# Patient Record
Sex: FEMALE | Race: WHITE | URBAN - METROPOLITAN AREA
[De-identification: names, ages, dates, MRNs, and addresses within clinical notes are randomized per-mention and may not be internally consistent; named-entity substitution may affect disease eponyms.]

---

## 2023-04-20 ENCOUNTER — PROCEDURE (OUTPATIENT)
Facility: LOCATION | Age: 63
End: 2023-04-20
Payer: COMMERCIAL

## 2023-04-20 DIAGNOSIS — H25.13 AGE-RELATED NUCLEAR CATARACT, BILATERAL: ICD-10-CM

## 2023-04-20 DIAGNOSIS — H04.123 DRY EYE SYNDROME OF BILATERAL LACRIMAL GLANDS: Primary | ICD-10-CM

## 2023-04-20 NOTE — IMPRESSION/PLAN
Impression: Examination revealed Cataracts OU. Patient reports blurry vision OU. Plan: 
RTC in 2 weeks for cat eval with Dr. Naresh Steiner.

## 2023-04-20 NOTE — IMPRESSION/PLAN
Impression: Examination revealed dry eye syndrome secondary to tear deficiencies. Ext x4 plugs placed today. Patient was consented and tolerated the procedure well. Plan: Continue ATs QID OU. RTC in 2-3 weeks for cat eval with Dr. Aleks Salazar.

## 2023-05-02 ENCOUNTER — OFFICE VISIT (OUTPATIENT)
Facility: LOCATION | Age: 63
End: 2023-05-02
Payer: COMMERCIAL

## 2023-05-02 DIAGNOSIS — H04.123 DRY EYE SYNDROME OF BILATERAL LACRIMAL GLANDS: ICD-10-CM

## 2023-05-02 DIAGNOSIS — H25.813 COMBINED FORMS OF AGE-RELATED CATARACT, BILATERAL: Primary | ICD-10-CM

## 2023-05-02 DIAGNOSIS — H16.223 KERATOCONJUNCT SICCA, NOT SPECIFIED AS SJOGREN'S, BILATERAL: ICD-10-CM

## 2023-05-02 DIAGNOSIS — H21.543 POSTERIOR SYNECHIAE (IRIS), BILATERAL: ICD-10-CM

## 2023-05-02 PROCEDURE — 92025 CPTRIZED CORNEAL TOPOGRAPHY: CPT | Performed by: OPHTHALMOLOGY

## 2023-05-02 PROCEDURE — 92134 CPTRZ OPH DX IMG PST SGM RTA: CPT | Performed by: OPHTHALMOLOGY

## 2023-05-02 PROCEDURE — 92136 OPHTHALMIC BIOMETRY: CPT | Performed by: OPHTHALMOLOGY

## 2023-05-02 PROCEDURE — 99214 OFFICE O/P EST MOD 30 MIN: CPT | Performed by: OPHTHALMOLOGY

## 2023-05-02 RX ORDER — DIFLUPREDNATE OPHTHALMIC 0.5 MG/ML
0.05 % EMULSION OPHTHALMIC
Qty: 5 | Refills: 0 | Status: ACTIVE
Start: 2023-05-02

## 2023-05-02 RX ORDER — OFLOXACIN 3 MG/ML
0.3 % SOLUTION/ DROPS OPHTHALMIC
Qty: 10 | Refills: 2 | Status: ACTIVE
Start: 2023-05-02

## 2023-05-02 RX ORDER — KETOROLAC TROMETHAMINE 5 MG/ML
0.5 % SOLUTION OPHTHALMIC
Qty: 10 | Refills: 2 | Status: ACTIVE
Start: 2023-05-02

## 2023-05-02 ASSESSMENT — VISUAL ACUITY
OS: 20/80
OD: 20/60

## 2023-05-02 ASSESSMENT — INTRAOCULAR PRESSURE
OS: 12
OD: 10

## 2023-05-02 NOTE — IMPRESSION/PLAN
Impression: Keratoconjunct sicca, not specified as Sjogren's, bilateral: J48.261. Plan: Refer to dry eye plan.

## 2023-05-02 NOTE — IMPRESSION/PLAN
Impression: Dry eye syndrome of bilateral lacrimal glands: H04.123. Plan: Patient to continue PFATs QID OU.

## 2023-05-02 NOTE — IMPRESSION/PLAN
Impression: Combined forms of age-related cataract, bilateral: H25.813. Plan: Discussed R/B/A of cataract surgery including risk of bleeding, infection, decreased BCVA, loss of eye. No guarantees, possible need  for further surgery. Patient expressed good understanding and wishes to proceed with CE/IOL OD then OS. Explained to patient that cataract surgery is a procedure performed on the eye and any procedure of the eye is known to cause or aggravate ocular surface disease and dry eye issues. Explained to patient that this can be a lifelong concern even without cataract surgery and the cost of this is not covered by the cost of cataract surgery. Patient expresses understanding. Patient is leaning towards standard cataract surgery. Discussed with patient that she may need glasses after cataract surgery. No guarantees were given. Patient expressed understanding. Goal: TBD

RTC for pre-op.

## 2023-05-02 NOTE — IMPRESSION/PLAN
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 Plan: Start Difluprednate BID OU -rx sent.

## 2023-05-09 ENCOUNTER — OFFICE VISIT (OUTPATIENT)
Facility: LOCATION | Age: 63
End: 2023-05-09
Payer: COMMERCIAL

## 2023-05-09 DIAGNOSIS — H21.543 POSTERIOR SYNECHIAE (IRIS), BILATERAL: ICD-10-CM

## 2023-05-09 DIAGNOSIS — H25.813 COMBINED FORMS OF AGE-RELATED CATARACT, BILATERAL: Primary | ICD-10-CM

## 2023-05-09 DIAGNOSIS — H40.033 ANATOMICAL NARROW ANGLE, BILATERAL: ICD-10-CM

## 2023-05-09 DIAGNOSIS — H04.123 DRY EYE SYNDROME OF BILATERAL LACRIMAL GLANDS: ICD-10-CM

## 2023-05-09 PROCEDURE — 99213 OFFICE O/P EST LOW 20 MIN: CPT | Performed by: OPHTHALMOLOGY

## 2023-05-09 ASSESSMENT — INTRAOCULAR PRESSURE
OS: 12
OD: 12

## 2023-05-09 NOTE — IMPRESSION/PLAN
Impression: Combined forms of age-related cataract, bilateral: H25.813. Plan: Discussed R/B/A of cataract surgery including risk of bleeding, infection, decreased BCVA, loss of eye. No guarantees, possible need  for further surgery. Patient expressed good understanding and wishes to proceed with CE/IOL OD then OS. Patient is leaning towards standard cataract surgery. Discussed with patient that she may need glasses after cataract surgery. No guarantees were given. Patient expressed understanding. Goal: plano OU [place] cuff day of cataract surgery.   Dextenza day of surgery

## 2023-05-25 ENCOUNTER — POST-OPERATIVE VISIT (OUTPATIENT)
Facility: LOCATION | Age: 63
End: 2023-05-25
Payer: COMMERCIAL

## 2023-05-25 DIAGNOSIS — Z48.810 ENCOUNTER FOR SURGICAL AFTERCARE FOLLOWING SURGERY ON A SENSE ORGAN: Primary | ICD-10-CM

## 2023-05-25 DIAGNOSIS — H25.812 COMBINED FORMS OF AGE-RELATED CATARACT, LEFT EYE: ICD-10-CM

## 2023-05-25 PROCEDURE — 99024 POSTOP FOLLOW-UP VISIT: CPT | Performed by: OPHTHALMOLOGY

## 2023-05-25 RX ORDER — PREDNISOLONE ACETATE 10 MG/ML
1 % SUSPENSION/ DROPS OPHTHALMIC
Qty: 3 | Refills: 2 | Status: ACTIVE
Start: 2023-05-25

## 2023-05-25 ASSESSMENT — INTRAOCULAR PRESSURE
OD: 15
OS: 16

## 2023-05-25 ASSESSMENT — VISUAL ACUITY: OD: 20/70

## 2023-05-25 NOTE — IMPRESSION/PLAN
Impression: S/P Cataract Extraction by phacoemulsification with IOL placement OD - 6 Days. Encounter for surgical aftercare following surgery on a sense organ  Z48.810. Recommending patient hold off on cataract sx until OD is feeling better and healed. Plan: Patient to start Prednisilone Acetate BID OD. (rx sent) RTC in 1 week with Dr. Curt Bonilla.

## 2023-06-06 ENCOUNTER — POST-OPERATIVE VISIT (OUTPATIENT)
Facility: LOCATION | Age: 63
End: 2023-06-06
Payer: COMMERCIAL

## 2023-06-06 DIAGNOSIS — Z96.1 PRESENCE OF INTRAOCULAR LENS: Primary | ICD-10-CM

## 2023-06-06 PROCEDURE — 99024 POSTOP FOLLOW-UP VISIT: CPT | Performed by: OPHTHALMOLOGY

## 2023-06-06 PROCEDURE — 92015 DETERMINE REFRACTIVE STATE: CPT | Performed by: OPHTHALMOLOGY

## 2023-06-06 PROCEDURE — 92134 CPTRZ OPH DX IMG PST SGM RTA: CPT | Performed by: OPHTHALMOLOGY

## 2023-06-06 ASSESSMENT — VISUAL ACUITY: OD: 20/200

## 2023-06-06 ASSESSMENT — INTRAOCULAR PRESSURE: OD: 23

## 2023-06-06 NOTE — IMPRESSION/PLAN
Impression: S/P Cataract Extraction by phacoemulsification with IOL placement OD - 18 Days. Presence of intraocular lens  Z96.1. Patient complains of pain and blurred vision. Plan: Stop Ketorolac, Prednisolone, and Ofloxacin. Continue Difluprednate BID OU. Patient to switch to PFATS Q2H OU. RTC in 2 weeks for AR/MR OU and MAC OCT OU with Dr. Marilyn Castillo.

## 2023-06-20 ENCOUNTER — POST-OPERATIVE VISIT (OUTPATIENT)
Facility: LOCATION | Age: 63
End: 2023-06-20
Payer: COMMERCIAL

## 2023-06-20 DIAGNOSIS — Z96.1 PRESENCE OF INTRAOCULAR LENS: Primary | ICD-10-CM

## 2023-06-20 DIAGNOSIS — H26.491 OTHER SECONDARY CATARACT, RIGHT EYE: ICD-10-CM

## 2023-06-20 DIAGNOSIS — H25.812 COMBINED FORMS OF AGE-RELATED CATARACT, LEFT EYE: ICD-10-CM

## 2023-06-20 PROCEDURE — 92015 DETERMINE REFRACTIVE STATE: CPT | Performed by: OPHTHALMOLOGY

## 2023-06-20 PROCEDURE — 99024 POSTOP FOLLOW-UP VISIT: CPT | Performed by: OPHTHALMOLOGY

## 2023-06-20 PROCEDURE — 92134 CPTRZ OPH DX IMG PST SGM RTA: CPT | Performed by: OPHTHALMOLOGY

## 2023-06-20 RX ORDER — PREDNISONE 10 MG/1
10 MG TABLET ORAL
Qty: 30 | Refills: 0 | Status: ACTIVE
Start: 2023-06-20

## 2023-06-20 RX ORDER — DIFLUPREDNATE OPHTHALMIC 0.5 MG/ML
0.05 % EMULSION OPHTHALMIC
Qty: 5 | Refills: 0 | Status: ACTIVE
Start: 2023-06-20

## 2023-06-20 RX ORDER — PREDNISONE 10 MG/1
10 MG TABLET ORAL
Qty: 30 | Refills: 0 | Status: INACTIVE
Start: 2023-06-20 | End: 2023-06-20

## 2023-06-20 ASSESSMENT — VISUAL ACUITY
OD: 20/80
OS: 20/100

## 2023-06-20 ASSESSMENT — INTRAOCULAR PRESSURE
OD: 15
OS: 15

## 2023-06-20 NOTE — IMPRESSION/PLAN
Impression: Combined forms of age-related cataract, left eye: H25.812. Plan: Discussed R/B/A of cataract surgery including risk of bleeding, infection, decreased BCVA, loss of eye. No guarantees, possible need  for further surgery. Patient expressed good understanding and wishes to proceed with CE/IOL OS. Patient is leaning towards standard cataract surgery. Discussed with patient that she may need glasses after cataract surgery. No guarantees were given. Patient expressed understanding. Goal: [facility] CATARACT CASE: 25-30 minutes. Dextenza at the time of cataract surgery. Patient to start oral prednisone 30 mg PO daily -rx sent. Second eye consents signed today.

## 2023-06-20 NOTE — IMPRESSION/PLAN
Impression: S/P Cataract Extraction by phacoemulsification with IOL placement OD - 32 Days. Presence of intraocular lens  Z96.1. Plan: D/C all post op drops. RTC PRN decrease VA, increase pain, redness, discharge, photophobia, flashes/floaters, or other vision problems.

## 2023-06-20 NOTE — IMPRESSION/PLAN
Impression: Other secondary cataract, right eye: H26.491. Patient complains of blurred vision. Plan: Consider YAG PC OD in the future.

## 2023-06-30 ENCOUNTER — POST-OPERATIVE VISIT (OUTPATIENT)
Facility: LOCATION | Age: 63
End: 2023-06-30
Payer: COMMERCIAL

## 2023-06-30 DIAGNOSIS — Z96.1 PRESENCE OF INTRAOCULAR LENS: Primary | ICD-10-CM

## 2023-06-30 DIAGNOSIS — S05.02XA CORNEAL ABRASION W/O FB OF LEFT EYE, INITIAL ENCOUNTER: ICD-10-CM

## 2023-06-30 PROCEDURE — 99024 POSTOP FOLLOW-UP VISIT: CPT | Performed by: OPHTHALMOLOGY

## 2023-06-30 ASSESSMENT — INTRAOCULAR PRESSURE: OS: 24

## 2023-06-30 NOTE — IMPRESSION/PLAN
Impression: S/P Cataract Extraction by phacoemulsification with IOL placement OS - . Presence of intraocular lens  Z96.1. Examination shows 2+ edema. Plan: Patient to continue antibiotic, steroid, and NSAID as instructed. RTC 1 week as scheduled- check VA, Ks, MR, IOP and re-evaluation of operated eye. Patient reminded of post-operative restrictions (do not rub eye, do not bend over, do not  more than 10-15 lbs, do not sleep on side of surgery, do not get water, dust or dirt in eye, wear plastic shield while sleeping). RTC PRN decrease VA, increase pain, redness, discharge, photophobia, flashes/floaters, or other vision problems.

## 2023-06-30 NOTE — IMPRESSION/PLAN
Impression: Corneal abrasion w/o FB of left eye, initial encounter: S05.02xA. Examination shows corneal abrasion in OS. BCL placed today. Acuvue Marley 0.0 8.8 [place]. Plan: See above.

## 2023-07-06 ENCOUNTER — POST-OPERATIVE VISIT (OUTPATIENT)
Facility: LOCATION | Age: 63
End: 2023-07-06
Payer: COMMERCIAL

## 2023-07-06 DIAGNOSIS — Z96.1 PRESENCE OF INTRAOCULAR LENS: Primary | ICD-10-CM

## 2023-07-06 PROCEDURE — 99024 POSTOP FOLLOW-UP VISIT: CPT | Performed by: OPHTHALMOLOGY

## 2023-07-06 ASSESSMENT — INTRAOCULAR PRESSURE
OS: 11
OD: 10

## 2023-07-06 ASSESSMENT — VISUAL ACUITY
OS: 20/80
OD: 20/80

## 2023-07-06 NOTE — IMPRESSION/PLAN
Impression: S/P Cataract Extraction by phacoemulsification with IOL placement OS - 6 Days. Presence of intraocular lens  Z96.1. Patient reports pain in OD that is on going. Says as long as she keeps her OD closed there is no pain. OS has slight improvement. Plan: Patient to continue using PFATS QID OU. D/C the Ofloxaxacin. RTC with Dr. Ryanne Rendon.

## 2023-07-11 ENCOUNTER — POST-OPERATIVE VISIT (OUTPATIENT)
Facility: LOCATION | Age: 63
End: 2023-07-11
Payer: COMMERCIAL

## 2023-07-11 DIAGNOSIS — H57.11 OCULAR PAIN, RIGHT EYE: Primary | ICD-10-CM

## 2023-07-11 DIAGNOSIS — Z96.1 PRESENCE OF INTRAOCULAR LENS: ICD-10-CM

## 2023-07-11 PROCEDURE — 92134 CPTRZ OPH DX IMG PST SGM RTA: CPT | Performed by: OPHTHALMOLOGY

## 2023-07-11 PROCEDURE — 99024 POSTOP FOLLOW-UP VISIT: CPT | Performed by: OPHTHALMOLOGY

## 2023-07-11 ASSESSMENT — INTRAOCULAR PRESSURE
OD: 13
OS: 14

## 2023-07-11 ASSESSMENT — VISUAL ACUITY
OD: 20/80
OS: 20/80

## 2023-07-11 NOTE — IMPRESSION/PLAN
Impression: Ocular pain, right eye: H57.11. Patient c/o pain right eye that is progressing. ct orbits brain with and without contrast Plan: Ordered a CT scan of orbit and brain with/ without contrast.

Start tapering Prednisone 20 mg tab PO BID then 10 mg tab PO BID then stop. RTC 6 weeks for re-eval with Dr. Daniel Mayes to bring CD with her

## 2024-10-15 ENCOUNTER — OFFICE VISIT (OUTPATIENT)
Facility: LOCATION | Age: 64
End: 2024-10-15
Payer: MEDICARE

## 2024-10-15 DIAGNOSIS — H57.11 OCULAR PAIN, RIGHT EYE: Primary | ICD-10-CM

## 2024-10-15 PROCEDURE — 99214 OFFICE O/P EST MOD 30 MIN: CPT | Performed by: OPHTHALMOLOGY

## 2024-10-15 ASSESSMENT — VISUAL ACUITY
OD: 20/400
OS: CF 3FT

## 2024-10-15 ASSESSMENT — INTRAOCULAR PRESSURE
OS: 10
OD: 11

## 2024-10-29 ENCOUNTER — OFFICE VISIT (OUTPATIENT)
Facility: LOCATION | Age: 64
End: 2024-10-29
Payer: MEDICARE

## 2024-10-29 DIAGNOSIS — H57.11 OCULAR PAIN, RIGHT EYE: Primary | ICD-10-CM

## 2024-10-29 PROCEDURE — 99214 OFFICE O/P EST MOD 30 MIN: CPT | Performed by: OPHTHALMOLOGY

## 2024-10-29 RX ORDER — PREDNISOLONE ACETATE 10 MG/ML
1 % SUSPENSION/ DROPS OPHTHALMIC
Qty: 5 | Refills: 2 | Status: ACTIVE
Start: 2024-10-29

## 2024-10-29 RX ORDER — PREDNISONE 10 MG/1
10 MG TABLET ORAL
Qty: 30 | Refills: 0 | Status: ACTIVE
Start: 2024-10-29

## 2024-10-29 RX ORDER — ATROPINE SULFATE 10 MG/ML
1 % SOLUTION OPHTHALMIC
Qty: 5 | Refills: 2 | Status: ACTIVE
Start: 2024-10-29

## 2024-10-29 ASSESSMENT — INTRAOCULAR PRESSURE: OS: 14

## 2024-11-27 ENCOUNTER — OFFICE VISIT (OUTPATIENT)
Facility: LOCATION | Age: 64
End: 2024-11-27
Payer: MEDICARE

## 2024-11-27 DIAGNOSIS — H57.11 OCULAR PAIN, RIGHT EYE: Primary | ICD-10-CM

## 2024-11-27 DIAGNOSIS — H04.123 DRY EYE SYNDROME OF BILATERAL LACRIMAL GLANDS: ICD-10-CM

## 2024-11-27 PROCEDURE — 99214 OFFICE O/P EST MOD 30 MIN: CPT | Performed by: STUDENT IN AN ORGANIZED HEALTH CARE EDUCATION/TRAINING PROGRAM

## 2025-07-24 ENCOUNTER — OFFICE VISIT (OUTPATIENT)
Facility: LOCATION | Age: 65
End: 2025-07-24
Payer: MEDICARE

## 2025-07-24 DIAGNOSIS — H16.223 KERATOCONJUNCT SICCA, NOT SPECIFIED AS SJOGREN'S, BILATERAL: ICD-10-CM

## 2025-07-24 DIAGNOSIS — H57.11 OCULAR PAIN, RIGHT EYE: ICD-10-CM

## 2025-07-24 DIAGNOSIS — H04.123 DRY EYE SYNDROME OF BILATERAL LACRIMAL GLANDS: Primary | ICD-10-CM

## 2025-07-24 PROCEDURE — 99213 OFFICE O/P EST LOW 20 MIN: CPT | Performed by: STUDENT IN AN ORGANIZED HEALTH CARE EDUCATION/TRAINING PROGRAM

## 2025-07-24 ASSESSMENT — INTRAOCULAR PRESSURE
OD: 13
OS: 13